# Patient Record
Sex: MALE | Race: WHITE | HISPANIC OR LATINO
[De-identification: names, ages, dates, MRNs, and addresses within clinical notes are randomized per-mention and may not be internally consistent; named-entity substitution may affect disease eponyms.]

---

## 2017-01-04 PROBLEM — Z00.00 ENCOUNTER FOR PREVENTIVE HEALTH EXAMINATION: Status: ACTIVE | Noted: 2017-01-04

## 2017-01-13 ENCOUNTER — APPOINTMENT (OUTPATIENT)
Dept: NEUROLOGY | Facility: CLINIC | Age: 42
End: 2017-01-13

## 2017-01-13 VITALS
HEIGHT: 69 IN | DIASTOLIC BLOOD PRESSURE: 80 MMHG | BODY MASS INDEX: 26.07 KG/M2 | TEMPERATURE: 98.5 F | SYSTOLIC BLOOD PRESSURE: 112 MMHG | HEART RATE: 84 BPM | WEIGHT: 176 LBS

## 2017-01-13 DIAGNOSIS — Z80.0 FAMILY HISTORY OF MALIGNANT NEOPLASM OF DIGESTIVE ORGANS: ICD-10-CM

## 2017-01-13 DIAGNOSIS — F15.90 OTHER STIMULANT USE, UNSPECIFIED, UNCOMPLICATED: ICD-10-CM

## 2017-01-13 DIAGNOSIS — Z78.9 OTHER SPECIFIED HEALTH STATUS: ICD-10-CM

## 2017-01-13 DIAGNOSIS — E78.00 PURE HYPERCHOLESTEROLEMIA, UNSPECIFIED: ICD-10-CM

## 2017-01-17 LAB
ACTH SER-ACNC: 39 PG/ML
ESTRADIOL SERPL-MCNC: 7 PG/ML
FSH SERPL-MCNC: 3.4 IU/L
GH SERPL-MCNC: <0.05 NG/ML
IGF BP1 SERPL-MCNC: 146 NG/ML
LH SERPL-ACNC: 5.9 IU/L
PROLACTIN SERPL-MCNC: 16.2 NG/ML
T4 SERPL-MCNC: 6.2 UG/DL
TESTOST SERPL-MCNC: 361.9 NG/DL

## 2017-01-20 ENCOUNTER — APPOINTMENT (OUTPATIENT)
Dept: OPHTHALMOLOGY | Facility: CLINIC | Age: 42
End: 2017-01-20

## 2017-02-08 ENCOUNTER — APPOINTMENT (OUTPATIENT)
Dept: SPINE | Facility: CLINIC | Age: 42
End: 2017-02-08

## 2017-02-08 VITALS — HEIGHT: 69 IN | WEIGHT: 180 LBS | BODY MASS INDEX: 26.66 KG/M2

## 2017-02-08 VITALS — DIASTOLIC BLOOD PRESSURE: 82 MMHG | SYSTOLIC BLOOD PRESSURE: 130 MMHG | HEART RATE: 108 BPM

## 2017-02-08 DIAGNOSIS — E23.7 DISORDER OF PITUITARY GLAND, UNSPECIFIED: ICD-10-CM

## 2017-03-10 ENCOUNTER — APPOINTMENT (OUTPATIENT)
Dept: NEUROLOGY | Facility: CLINIC | Age: 42
End: 2017-03-10

## 2017-03-28 ENCOUNTER — APPOINTMENT (OUTPATIENT)
Dept: ENDOCRINOLOGY | Facility: CLINIC | Age: 42
End: 2017-03-28

## 2017-06-08 ENCOUNTER — APPOINTMENT (OUTPATIENT)
Dept: SURGERY | Facility: CLINIC | Age: 42
End: 2017-06-08

## 2017-06-16 ENCOUNTER — OUTPATIENT (OUTPATIENT)
Dept: OUTPATIENT SERVICES | Facility: HOSPITAL | Age: 42
LOS: 1 days | End: 2017-06-16
Payer: COMMERCIAL

## 2017-06-16 VITALS
DIASTOLIC BLOOD PRESSURE: 77 MMHG | WEIGHT: 179.9 LBS | TEMPERATURE: 98 F | OXYGEN SATURATION: 100 % | SYSTOLIC BLOOD PRESSURE: 126 MMHG | RESPIRATION RATE: 18 BRPM | HEART RATE: 77 BPM | HEIGHT: 69 IN

## 2017-06-16 DIAGNOSIS — R19.00 INTRA-ABDOMINAL AND PELVIC SWELLING, MASS AND LUMP, UNSPECIFIED SITE: ICD-10-CM

## 2017-06-16 DIAGNOSIS — Z01.818 ENCOUNTER FOR OTHER PREPROCEDURAL EXAMINATION: ICD-10-CM

## 2017-06-16 DIAGNOSIS — R22.2 LOCALIZED SWELLING, MASS AND LUMP, TRUNK: ICD-10-CM

## 2017-06-16 PROCEDURE — G0463: CPT

## 2017-06-16 NOTE — H&P PST ADULT - NEGATIVE CARDIOVASCULAR SYMPTOMS
no palpitations/no dyspnea on exertion/no claudication/no chest pain/no orthopnea/no paroxysmal nocturnal dyspnea/no peripheral edema

## 2017-06-16 NOTE — H&P PST ADULT - NEGATIVE NEUROLOGICAL SYMPTOMS
no paresthesias/no tremors/no headache/no focal seizures/no generalized seizures/no loss of sensation/no vertigo/no syncope/no weakness

## 2017-06-16 NOTE — H&P PST ADULT - HISTORY OF PRESENT ILLNESS
41 y 41 yr old male with pituitary microadenoma presents with left abdominal and flank masses for months that have been enlarging in size and causing discomfort intermittently. Pt for excision of left abdominal and flank masses on 6/21/2017.

## 2017-06-16 NOTE — H&P PST ADULT - RS GEN PE MLT RESP DETAILS PC
no subcutaneous emphysema/good air movement/no rales/no wheezes/respirations non-labored/no rhonchi/airway patent/clear to auscultation bilaterally/no intercostal retractions/normal/no chest wall tenderness/breath sounds equal

## 2017-06-16 NOTE — H&P PST ADULT - ASSESSMENT
41 yr old male with pituitary microadenoma presents with left abdominal and flank masses for months that have been enlarging in size and causing discomfort intermittently. Pt for excision of left abdominal and flank masses on 6/21/2017.

## 2017-06-16 NOTE — H&P PST ADULT - NEGATIVE ALLERGY TYPES
no indoor environmental allergies/no reactions to insect bites/no reactions to food/no outdoor environmental allergies/no reactions to animals

## 2017-07-19 ENCOUNTER — APPOINTMENT (OUTPATIENT)
Dept: SPINE | Facility: CLINIC | Age: 42
End: 2017-07-19

## 2019-03-26 ENCOUNTER — TRANSCRIPTION ENCOUNTER (OUTPATIENT)
Age: 44
End: 2019-03-26

## 2021-03-16 ENCOUNTER — EMERGENCY (EMERGENCY)
Facility: HOSPITAL | Age: 46
LOS: 1 days | Discharge: HOME | End: 2021-03-16
Attending: EMERGENCY MEDICINE
Payer: COMMERCIAL

## 2021-03-16 VITALS
SYSTOLIC BLOOD PRESSURE: 186 MMHG | OXYGEN SATURATION: 98 % | HEIGHT: 69 IN | TEMPERATURE: 98 F | DIASTOLIC BLOOD PRESSURE: 70 MMHG | RESPIRATION RATE: 17 BRPM | HEART RATE: 119 BPM

## 2021-03-16 PROCEDURE — 93010 ELECTROCARDIOGRAM REPORT: CPT

## 2021-03-16 PROCEDURE — 99285 EMERGENCY DEPT VISIT HI MDM: CPT

## 2021-03-17 LAB
ALBUMIN SERPL ELPH-MCNC: 4.4 G/DL — SIGNIFICANT CHANGE UP (ref 3.5–5)
ALP SERPL-CCNC: 126 U/L — HIGH (ref 40–120)
ALT FLD-CCNC: 43 U/L DA — SIGNIFICANT CHANGE UP (ref 10–60)
ANION GAP SERPL CALC-SCNC: 9 MMOL/L — SIGNIFICANT CHANGE UP (ref 5–17)
APPEARANCE UR: CLEAR — SIGNIFICANT CHANGE UP
AST SERPL-CCNC: 15 U/L — SIGNIFICANT CHANGE UP (ref 10–40)
BASOPHILS # BLD AUTO: 0.05 K/UL — SIGNIFICANT CHANGE UP (ref 0–0.2)
BASOPHILS NFR BLD AUTO: 0.5 % — SIGNIFICANT CHANGE UP (ref 0–2)
BILIRUB SERPL-MCNC: 0.2 MG/DL — SIGNIFICANT CHANGE UP (ref 0.2–1.2)
BILIRUB UR-MCNC: NEGATIVE — SIGNIFICANT CHANGE UP
BUN SERPL-MCNC: 11 MG/DL — SIGNIFICANT CHANGE UP (ref 7–18)
CALCIUM SERPL-MCNC: 9.7 MG/DL — SIGNIFICANT CHANGE UP (ref 8.4–10.5)
CHLORIDE SERPL-SCNC: 106 MMOL/L — SIGNIFICANT CHANGE UP (ref 96–108)
CO2 SERPL-SCNC: 26 MMOL/L — SIGNIFICANT CHANGE UP (ref 22–31)
COLOR SPEC: YELLOW — SIGNIFICANT CHANGE UP
CREAT SERPL-MCNC: 0.94 MG/DL — SIGNIFICANT CHANGE UP (ref 0.5–1.3)
D DIMER BLD IA.RAPID-MCNC: <150 NG/ML DDU — SIGNIFICANT CHANGE UP
DIFF PNL FLD: NEGATIVE — SIGNIFICANT CHANGE UP
EOSINOPHIL # BLD AUTO: 0.04 K/UL — SIGNIFICANT CHANGE UP (ref 0–0.5)
EOSINOPHIL NFR BLD AUTO: 0.4 % — SIGNIFICANT CHANGE UP (ref 0–6)
GLUCOSE SERPL-MCNC: 106 MG/DL — HIGH (ref 70–99)
GLUCOSE UR QL: NEGATIVE — SIGNIFICANT CHANGE UP
HCT VFR BLD CALC: 44.4 % — SIGNIFICANT CHANGE UP (ref 39–50)
HGB BLD-MCNC: 15.6 G/DL — SIGNIFICANT CHANGE UP (ref 13–17)
HIV 1 & 2 AB SERPL IA.RAPID: SIGNIFICANT CHANGE UP
IMM GRANULOCYTES NFR BLD AUTO: 0.2 % — SIGNIFICANT CHANGE UP (ref 0–1.5)
KETONES UR-MCNC: NEGATIVE — SIGNIFICANT CHANGE UP
LEUKOCYTE ESTERASE UR-ACNC: NEGATIVE — SIGNIFICANT CHANGE UP
LYMPHOCYTES # BLD AUTO: 1.18 K/UL — SIGNIFICANT CHANGE UP (ref 1–3.3)
LYMPHOCYTES # BLD AUTO: 12.8 % — LOW (ref 13–44)
MCHC RBC-ENTMCNC: 29.9 PG — SIGNIFICANT CHANGE UP (ref 27–34)
MCHC RBC-ENTMCNC: 35.1 GM/DL — SIGNIFICANT CHANGE UP (ref 32–36)
MCV RBC AUTO: 85.1 FL — SIGNIFICANT CHANGE UP (ref 80–100)
MONOCYTES # BLD AUTO: 0.5 K/UL — SIGNIFICANT CHANGE UP (ref 0–0.9)
MONOCYTES NFR BLD AUTO: 5.4 % — SIGNIFICANT CHANGE UP (ref 2–14)
NEUTROPHILS # BLD AUTO: 7.4 K/UL — SIGNIFICANT CHANGE UP (ref 1.8–7.4)
NEUTROPHILS NFR BLD AUTO: 80.7 % — HIGH (ref 43–77)
NITRITE UR-MCNC: NEGATIVE — SIGNIFICANT CHANGE UP
NRBC # BLD: 0 /100 WBCS — SIGNIFICANT CHANGE UP (ref 0–0)
PH UR: 7 — SIGNIFICANT CHANGE UP (ref 5–8)
PLATELET # BLD AUTO: 226 K/UL — SIGNIFICANT CHANGE UP (ref 150–400)
POTASSIUM SERPL-MCNC: 4 MMOL/L — SIGNIFICANT CHANGE UP (ref 3.5–5.3)
POTASSIUM SERPL-SCNC: 4 MMOL/L — SIGNIFICANT CHANGE UP (ref 3.5–5.3)
PROT SERPL-MCNC: 8.1 G/DL — SIGNIFICANT CHANGE UP (ref 6–8.3)
PROT UR-MCNC: NEGATIVE — SIGNIFICANT CHANGE UP
RBC # BLD: 5.22 M/UL — SIGNIFICANT CHANGE UP (ref 4.2–5.8)
RBC # FLD: 12.6 % — SIGNIFICANT CHANGE UP (ref 10.3–14.5)
SARS-COV-2 RNA SPEC QL NAA+PROBE: SIGNIFICANT CHANGE UP
SODIUM SERPL-SCNC: 141 MMOL/L — SIGNIFICANT CHANGE UP (ref 135–145)
SP GR SPEC: 1.01 — SIGNIFICANT CHANGE UP (ref 1.01–1.02)
TROPONIN I SERPL-MCNC: <0.015 NG/ML — SIGNIFICANT CHANGE UP (ref 0–0.04)
UROBILINOGEN FLD QL: NEGATIVE — SIGNIFICANT CHANGE UP
WBC # BLD: 9.19 K/UL — SIGNIFICANT CHANGE UP (ref 3.8–10.5)
WBC # FLD AUTO: 9.19 K/UL — SIGNIFICANT CHANGE UP (ref 3.8–10.5)

## 2021-03-17 PROCEDURE — 80053 COMPREHEN METABOLIC PANEL: CPT

## 2021-03-17 PROCEDURE — 85025 COMPLETE CBC W/AUTO DIFF WBC: CPT

## 2021-03-17 PROCEDURE — 99284 EMERGENCY DEPT VISIT MOD MDM: CPT

## 2021-03-17 PROCEDURE — 85379 FIBRIN DEGRADATION QUANT: CPT

## 2021-03-17 PROCEDURE — U0005: CPT

## 2021-03-17 PROCEDURE — 87635 SARS-COV-2 COVID-19 AMP PRB: CPT

## 2021-03-17 PROCEDURE — 84484 ASSAY OF TROPONIN QUANT: CPT

## 2021-03-17 PROCEDURE — 93005 ELECTROCARDIOGRAM TRACING: CPT

## 2021-03-17 PROCEDURE — 36415 COLL VENOUS BLD VENIPUNCTURE: CPT

## 2021-03-17 PROCEDURE — 81003 URINALYSIS AUTO W/O SCOPE: CPT

## 2021-03-17 PROCEDURE — 86703 HIV-1/HIV-2 1 RESULT ANTBDY: CPT

## 2021-03-17 PROCEDURE — 71046 X-RAY EXAM CHEST 2 VIEWS: CPT | Mod: 26

## 2021-03-17 PROCEDURE — 71046 X-RAY EXAM CHEST 2 VIEWS: CPT

## 2021-03-17 RX ORDER — AMLODIPINE BESYLATE 2.5 MG/1
5 TABLET ORAL ONCE
Refills: 0 | Status: COMPLETED | OUTPATIENT
Start: 2021-03-17 | End: 2021-03-17

## 2021-03-17 RX ADMIN — AMLODIPINE BESYLATE 5 MILLIGRAM(S): 2.5 TABLET ORAL at 00:46

## 2021-03-17 NOTE — ED PROVIDER NOTE - OBJECTIVE STATEMENT
Pt states he checked BP at home prior to arrival and was elevated, pt does not recall parameters. No chest pain, no shortness of breath, no headache, no vision changes.   I confirmed with wife who states pt is on Norvasc 5mg.

## 2021-03-17 NOTE — ED PROVIDER NOTE - NSFOLLOWUPINSTRUCTIONS_ED_ALL_ED_FT
Hipertensión en los adultos    Hypertension, Adult      La presión arterial antonio (hipertensión) se produce cuando la fuerza de la tammy bombea a través de las arterias con mucha fuerza. Las arterias son los vasos sanguíneos que transportan la tammy desde el corazón al león del cuerpo. La hipertensión hace que el corazón checo más esfuerzo para bombear tammy y puede provocar que las arterias se estrechen o endurezcan. La hipertensión no tratada o no controlada puede causar infarto de miocardio, insuficiencia cardíaca, accidente cerebrovascular, enfermedad renal y otros problemas.    Lyle lectura de la presión arterial consta de un número más alto sobre un número más bajo. En condiciones ideales, la presión arterial debe estar por debajo de 120/80. El primer número (“superior”) es la presión sistólica. Es la medida de la presión de las arterias cuando el corazón late. El wilner número (“inferior”) es la presión diastólica. Es la medida de la presión en las arterias cuando el corazón se relaja.      ¿Cuáles son las causas?    Se desconoce la causa exacta de esta afección. Hay algunas afecciones que causan presión arterial antonio o están relacionadas con davi.      ¿Qué incrementa el riesgo?  Algunos factores de riesgo de hipertensión están bajo eagle control. Los siguientes factores pueden hacer que sea más propenso a desarrollar esta afección:  •Fumar.      •Tener diabetes mellitus tipo 2, colesterol alto, o ambos.      •No hacer la cantidad suficiente de actividad física o ejercicio.      •Tener sobrepeso.      •Consumir mucha grasa, azúcar, calorías o sal (sodio) en eagle dieta.      •Beber alcohol en exceso.    Algunos factores de riesgo para la presión arterial antonio pueden ser difíciles o imposibles de cambiar. Algunos de estos factores son los siguientes:  •Tener enfermedad renal crónica.      •Tener antecedentes familiares de presión arterial antonio.      •Edad. Los riesgos aumentan con la edad.      •Deric. El riesgo es mayor para las personas afroamericanas.      •Sexo. Antes de los 45 años, los hombres corren más riesgo que las mujeres. Después de los 65 años, las mujeres corren más riesgo que los hombres.      •Tener apnea obstructiva del sueño.      •Estrés.        ¿Cuáles son los signos o los síntomas?  Es posible que la presión arterial antonio puede no cause síntomas. La presión arterial muy antonio (crisis hipertensiva) puede provocar:  •Dolor de ezequiel.      •Ansiedad.      •Falta de aire.      •Hemorragia nasal.      •Náuseas y vómitos.      •Cambios en la visión.      •Dolor de pecho intenso.      •Convulsiones.        ¿Cómo se diagnostica?    Esta afección se diagnostica al medir eagle presión arterial mientras se encuentra sentado, con el brazo apoyado sobre lyle superficie plana, las piernas sin cruzar y los pies nabila apoyados en el piso. El brazalete del tensiómetro debe colocarse directamente sobre la piel de la parte superior del brazo y al nivel de eagle corazón. Debe medirla al menos dos veces en el mismo brazo. Determinadas condiciones pueden causar lyle diferencia de presión arterial entre el brazo prince y el derecho.  Ciertos factores pueden provocar que las lecturas de la presión arterial anahi inferiores o superiores a lo normal por un período corto de tiempo:  •Si eagle presión arterial es más antonio cuando se encuentra en el consultorio del médico que cuando la mide en eagle hogar, se denomina “hipertensión de bata linda”. La mayoría de las personas que tienen esta afección no deben ser medicadas.      •Si eagle presión arterial es más antonio en el hogar que cuando se encuentra en el consultorio del médico, se denomina “hipertensión enmascarada”. La mayoría de las personas que tienen esta afección deben ser medicadas para controlar la presión arterial.    Si tiene lyle lecturas de presión arterial antonio colton lyle visita o si tiene presión arterial normal con otros factores de riesgo, se le podrá pedir que checo lo siguiente:  •Que regrese otro día para volver a controlar eagle presión arterial nuevamente.      •Que se controle la presión arterial en eagle casa colton 1 semana o más.      Si se le diagnostica hipertensión, es posible que se le realicen otros análisis de tammy o estudios de diagnóstico por imágenes para ayudar a eagle médico a comprender eagle riesgo general de tener otras afecciones.      ¿Cómo se trata?  Esta afección se trata haciendo cambios saludables en el estilo de case, tales sim ingerir alimentos saludables, realizar más ejercicio y reducir el consumo de alcohol. El médico puede recetarle medicamentos si los cambios en el estilo de case no son suficientes para lograr controlar la presión arterial y si:  •Eagle presión arterial sistólica está por encima de 130.      •Eagle presión arterial diastólica está por encima de 80.      La presión arterial deseada puede variar en función de las enfermedades, la edad y otros factores personales.      Siga estas instrucciones en eagle casa:      Comida y bebida    •Siga lyle dieta con alto contenido de fibras y potasio, y con bajo contenido de sodio, azúcar agregada y grasas. Un ejemplo de plan alimenticio es la dieta DASH (Dietary Approaches to Stop Hypertension, Métodos alimenticios para detener la hipertensión). Para alimentarse de esta manera:  •Coma mucha fruta y verdura fresca. Trate de que la mitad del plato de cada comida sea de frutas y verduras.      •Coma cereales integrales, sim pasta integral, arroz integral o pan integral. Llene aproximadamente un cuarto del plato con cereales integrales.      •Coma y thomas productos lácteos con bajo contenido de grasa, sim leche descremada o yogur bajo en grasas.      •Evite la ingesta de sarah de carne grasa, carne procesada o curada, y carne de ave con piel. Llene aproximadamente un cuarto del plato con proteínas magras, sim pescado, momo sin piel, frijoles, huevos o tofu.      •Evite ingerir alimentos prehechos y procesados. En general, estos tienen mayor cantidad de sodio, azúcar agregada y grasa.        •Reduzca eagle ingesta diaria de sodio. La mayoría de las personas que tienen hipertensión deben comer menos de 1500 mg de sodio por día.    • No thomas alcohol si:  •Eagle médico le indica no hacerlo.      •Está embarazada, puede estar embarazada o está tratando de quedar embarazada.      •Si ubaldo alcohol:•Limite la cantidad que ubaldo a lo siguiente:  •De 0 a 1 medida por día para las mujeres.      •De 0 a 2 medidas por día para los hombres.        •Esté atento a la cantidad de alcohol que hay en las bebidas que [place]. En los [place], lyle medida equivale a lyle botella de cerveza de 12 oz (355 ml), un vaso de vino de 5 oz (148 ml) o un vaso de lyle bebida alcohólica de antonio graduación de 1½ oz (44 ml).          Estilo de case      •Trabaje con eagle médico para mantener un peso saludable o perder peso. Pregúntele cuál es el peso recomendado para usted.      •Checo al menos 30 minutos de ejercicio la mayoría de los días de la semana. Estas actividades pueden incluir caminar, nadar o andar en bicicleta.      •Incluya ejercicios para fortalecer emi músculos (ejercicios de resistencia), sim [place] o levantamiento de pesas, sim parte de eagle rutina semanal de ejercicios. Intente realizar 30 minutos de dionne tipo de ejercicios al menos jenelle días a la semana.      • No consuma ningún producto que contenga nicotina o tabaco, sim cigarrillos, cigarrillos electrónicos y tabaco de mascar. Si necesita ayuda para dejar de fumar, consulte al médico.      •Contrólese la presión arterial en eagle casa según las indicaciones del médico.      •Concurra a todas las visitas de seguimiento sim se lo haya indicado el médico. [place] es importante.      Medicamentos     •[place] los medicamentos de venta charla y los recetados solamente sim se lo haya indicado el médico. Siga cuidadosamente las indicaciones. Los medicamentos para la presión arterial deben tomarse según las indicaciones.      • No omita las dosis de medicamentos para la presión arterial. Si lo hace, estará en riesgo de tener problemas y puede hacer que los medicamentos anahi menos eficaces.      •Pregúntele a eagle médico a qué efectos secundarios o reacciones a los medicamentos debe prestar atención.        Comuníquese con un médico si:    •Piensa que tiene lyle reacción a un medicamento que está tomando.      •Tiene jonathan de ezequiel frecuentes (recurrentes).      •Se siente mareado.      •Tiene hinchazón en los tobillos.      •Tiene problemas de visión.        Solicite ayuda inmediatamente si:    •Siente un dolor de ezequiel intenso o confusión.      •Siente debilidad inusual o adormecimiento.      •Siente que va a desmayarse.      •Siente un dolor intenso en el pecho o el abdomen.      •Vomita repetidas veces.      •Tiene dificultad para respirar.        Resumen    •La hipertensión se produce cuando la tammy bombea en las arterias con mucha fuerza. Si esta afección no se controla, podría correr riesgo de tener complicaciones graves.      •La presión arterial deseada puede variar en función de las enfermedades, la edad y otros factores personales. Para la mayoría de las personas, lyle presión arterial normal es abdelrahman que 120/80.      •La hipertensión se trata con cambios en el estilo de case, medicamentos o lyle combinación de ambos. Los cambios en el estilo de case incluyen pérdida de peso, ingerir alimentos sanos, seguir lyle dieta baja en sodio, hacer más ejercicio y limitar el consumo de alcohol.      Esta información no tiene sim fin reemplazar el consejo del médico. Asegúrese de hacerle al médico cualquier pregunta que tenga.      Document Revised: 10/03/2019 Document Reviewed: 10/03/2019

## 2021-03-17 NOTE — ED PROVIDER NOTE - PATIENT PORTAL LINK FT
You can access the FollowMyHealth Patient Portal offered by [facility] by registering at the following website: [email or web address]. By joining [facility]’s FollowMyHealth portal, you will also be able to view your health information using other applications (apps) compatible with our system.

## 2021-03-17 NOTE — ED PROVIDER NOTE - ENMT, MLM
Airway patent, Nasal mucosa clear. Mouth with normal mucosa. Throat has no vesicles, no oropharyngeal exudates and uvula is midline. Finasteride Counseling:  I discussed with the patient the risks of use of finasteride including but not limited to decreased libido, decreased ejaculate volume, gynecomastia, and depression. Women should not handle medication.  All of the patient's questions and concerns were addressed. Finasteride Male Counseling: Finasteride Counseling:  I discussed with the patient the risks of use of finasteride including but not limited to decreased libido, decreased ejaculate volume, gynecomastia, and depression. Women should not handle medication.  All of the patient's questions and concerns were addressed.

## 2021-03-17 NOTE — ED PROVIDER NOTE - CARE PROVIDER_API CALL
Ruslan Cabello  INTERNAL MEDICINE  [address] [place] [id number]  Phone: [phone]  Fax: [phone]  Follow Up Time:

## 2021-03-17 NOTE — ED PROVIDER NOTE - CLINICAL SUMMARY MEDICAL DECISION MAKING FREE TEXT BOX
12:30a /99 HR 97, pt is asymptomatic, will f/u diagnostics. 12:30a /99 HR 97, pt is asymptomatic, will f/u diagnostics.  235a- Pt remains asymptomatic. /90, HR 90. Pt has sufficient Norvasc 5mg and 12:30a /99 HR 97, pt is asymptomatic, will f/u diagnostics.  235a- Pt remains asymptomatic. /90, HR 90. No proteinuria, no renal dysfunction.  Labs reviewed with pt and wife (on speaker phone). Pt has sufficient Norvasc 5mg and will f/u with PMD Dr. Ruslan Cabello in am.  Pt is well appearing, has no new complaints and able to walk with normal gait. Pt is stable for discharge and follow up with medical doctor. Pt educated on care and need for follow up. Discussed anticipatory guidance and return precautions. Questions answered. I had a detailed discussion with the patient and/or guardian regarding the historical points, exam findings, and any diagnostic results supporting the discharge diagnosis.

## 2021-06-14 NOTE — ED PROVIDER NOTE - IV ALTEPLASE EXCL ABS HIDDEN
Call from patient's daughter Paulie Rosas  Patient is relocating and has an appointment with [facility] is Michelle Sheffield that he will be seeing on 7/8/21  I did provide the number for family to call for records to be sent  Patient does have a device and will need account transferred  No sure if you needed to wait until patient was seen and have their office request the transfer? ? Office number is [phone]  Please call daughter Paulie Rosas with any questions at [phone]  show

## 2021-08-19 ENCOUNTER — TRANSCRIPTION ENCOUNTER (OUTPATIENT)
Age: 46
End: 2021-08-19

## 2022-09-20 ENCOUNTER — NON-APPOINTMENT (OUTPATIENT)
Age: 47
End: 2022-09-20

## 2022-10-03 ENCOUNTER — NON-APPOINTMENT (OUTPATIENT)
Age: 47
End: 2022-10-03

## 2024-03-20 ENCOUNTER — NON-APPOINTMENT (OUTPATIENT)
Age: 49
End: 2024-03-20

## 2024-04-23 NOTE — ED PROVIDER NOTE - CPE EDP GASTRO NORM
normal...
Pt lives in a house w/ 10 JAMESON. Pt lives w/  who is retired and around to provide help. Pt states they were (I) w/ all functional mobility and ADL's PTA. Pt owns no DME.

## 2025-06-25 NOTE — ED ADULT TRIAGE NOTE - BP NONINVASIVE SYSTOLIC (MM HG)
[de-identified] : after discussion we decided to do a hyaluronic acid injection.  risks, benefits and alternatives discussed, consent obtained.  under sterile conditions using the anterolateral portal I injected 2cc of hyaluronic acid into the knee.  patient tolerated procedure well and will fu as scheduled or as needed. right knee euflexxa  also getting cortisone left hip w dr cole today 046